# Patient Record
Sex: MALE
[De-identification: names, ages, dates, MRNs, and addresses within clinical notes are randomized per-mention and may not be internally consistent; named-entity substitution may affect disease eponyms.]

---

## 2019-08-28 PROBLEM — Z00.00 ENCOUNTER FOR PREVENTIVE HEALTH EXAMINATION: Status: ACTIVE | Noted: 2019-08-28

## 2019-09-04 ENCOUNTER — APPOINTMENT (OUTPATIENT)
Dept: CARDIOLOGY | Facility: CLINIC | Age: 74
End: 2019-09-04
Payer: MEDICARE

## 2019-09-04 PROCEDURE — 99204 OFFICE O/P NEW MOD 45 MIN: CPT

## 2019-09-04 PROCEDURE — 93000 ELECTROCARDIOGRAM COMPLETE: CPT

## 2019-09-04 PROCEDURE — 93880 EXTRACRANIAL BILAT STUDY: CPT

## 2019-09-06 ENCOUNTER — APPOINTMENT (OUTPATIENT)
Dept: CARDIOLOGY | Facility: CLINIC | Age: 74
End: 2019-09-06
Payer: MEDICARE

## 2019-09-06 PROCEDURE — 93978 VASCULAR STUDY: CPT

## 2019-09-20 ENCOUNTER — APPOINTMENT (OUTPATIENT)
Dept: CARDIOLOGY | Facility: CLINIC | Age: 74
End: 2019-09-20
Payer: MEDICARE

## 2019-09-20 PROCEDURE — 93320 DOPPLER ECHO COMPLETE: CPT

## 2019-09-20 PROCEDURE — 93351 STRESS TTE COMPLETE: CPT

## 2019-09-20 PROCEDURE — 93325 DOPPLER ECHO COLOR FLOW MAPG: CPT

## 2019-10-31 ENCOUNTER — APPOINTMENT (OUTPATIENT)
Dept: CARDIOLOGY | Facility: CLINIC | Age: 74
End: 2019-10-31
Payer: MEDICARE

## 2019-10-31 PROCEDURE — 99214 OFFICE O/P EST MOD 30 MIN: CPT

## 2019-10-31 PROCEDURE — 93000 ELECTROCARDIOGRAM COMPLETE: CPT

## 2024-03-06 ENCOUNTER — APPOINTMENT (OUTPATIENT)
Dept: ORTHOPEDIC SURGERY | Facility: CLINIC | Age: 79
End: 2024-03-06
Payer: MEDICARE

## 2024-03-06 VITALS — WEIGHT: 190 LBS | BODY MASS INDEX: 28.79 KG/M2 | HEIGHT: 68 IN

## 2024-03-06 DIAGNOSIS — M70.22 OLECRANON BURSITIS, LEFT ELBOW: ICD-10-CM

## 2024-03-06 PROCEDURE — 99203 OFFICE O/P NEW LOW 30 MIN: CPT | Mod: 25

## 2024-03-06 PROCEDURE — 73080 X-RAY EXAM OF ELBOW: CPT | Mod: LT

## 2024-03-06 PROCEDURE — 20605 DRAIN/INJ JOINT/BURSA W/O US: CPT | Mod: LT

## 2024-03-06 RX ORDER — METOPROLOL TARTRATE 75 MG/1
TABLET, FILM COATED ORAL
Refills: 0 | Status: ACTIVE | COMMUNITY

## 2024-03-06 NOTE — DISCUSSION/SUMMARY
[de-identified] : Impression: Olecranon bursitis left elbow  Plan: Patient was advised for aspiration of the olecranon bursa, patient agrees. Sterile technique using alcohol about the line as an antiseptic and ethyl chloride as an anesthetic, 13 cc of bloody fluid was aspirated, patient tolerated well. Patient was advised for compression for a week. Patient was advised that even if this were to full with fluid again we will in no aspiration for at least a week.  Follow-up: As needed

## 2024-03-06 NOTE — IMAGING
[de-identified] : Examination of the left elbow, patient has good range of motion to flexion, extension, pronation and supination with no end of range pain. No focal tenderness palpation. Fluid over the olecranon. No warmth to palpation, no signs of infection. Neurovascular intact.  Three-view radiograph of the left elbow was done in office today, there are some degenerative changes noted. Degrees at any olecranon spur. There is fluid over the olecranon bursa.

## 2024-03-06 NOTE — HISTORY OF PRESENT ILLNESS
[de-identified] : 78-year-old male here for evaluation of fluid on the elbow, he does not report any trauma or any injury.,  He denies any pain.